# Patient Record
(demographics unavailable — no encounter records)

---

## 2024-10-25 NOTE — HISTORY OF PRESENT ILLNESS
[FreeTextEntry1] : LMP 10/14 34yo G0 here for infertility, has been ttc x 6 mo without success. Tracking cycles on mariaelena and reports reg cycles, targeting ovulation week for intercourse 3-4x.   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

## 2024-10-31 NOTE — PLAN
[FreeTextEntry1] : Discussed recs to start low dose synthroid 25mcg and recheck levels in 4-6 weeks, to take on empty stomach  in the AM. To f/u with [facility] as well.

## 2024-10-31 NOTE — REASON FOR VISIT
[Home] : at home, [unfilled] , at the time of the visit. [Medical Office: ([facility])___] : at the medical office located in  [Patient] : the patient

## 2024-10-31 NOTE — HISTORY OF PRESENT ILLNESS
[FreeTextEntry1] : 34yo P0 here for review results, has been ttc for 6mo without success. TSH slightly elev 2.8, other labs wnl. Has not made appt to see [facility].